# Patient Record
Sex: FEMALE | Race: WHITE | NOT HISPANIC OR LATINO
[De-identification: names, ages, dates, MRNs, and addresses within clinical notes are randomized per-mention and may not be internally consistent; named-entity substitution may affect disease eponyms.]

---

## 2017-07-24 PROBLEM — Z00.00 ENCOUNTER FOR PREVENTIVE HEALTH EXAMINATION: Status: ACTIVE | Noted: 2017-07-24

## 2017-08-08 ENCOUNTER — OTHER (OUTPATIENT)
Age: 22
End: 2017-08-08

## 2017-08-08 DIAGNOSIS — M25.561 PAIN IN RIGHT KNEE: ICD-10-CM

## 2017-08-23 ENCOUNTER — APPOINTMENT (OUTPATIENT)
Dept: ORTHOPEDIC SURGERY | Facility: CLINIC | Age: 22
End: 2017-08-23
Payer: COMMERCIAL

## 2017-08-23 VITALS — HEIGHT: 65 IN | BODY MASS INDEX: 24.99 KG/M2 | WEIGHT: 150 LBS

## 2017-08-23 VITALS — WEIGHT: 150 LBS | HEIGHT: 65 IN | BODY MASS INDEX: 24.99 KG/M2

## 2017-08-23 DIAGNOSIS — M25.561 PAIN IN RIGHT KNEE: ICD-10-CM

## 2017-08-23 DIAGNOSIS — M25.562 PAIN IN RIGHT KNEE: ICD-10-CM

## 2017-08-23 DIAGNOSIS — M22.41 CHONDROMALACIA PATELLAE, RIGHT KNEE: ICD-10-CM

## 2017-08-23 DIAGNOSIS — Z78.9 OTHER SPECIFIED HEALTH STATUS: ICD-10-CM

## 2017-08-23 DIAGNOSIS — Z80.9 FAMILY HISTORY OF MALIGNANT NEOPLASM, UNSPECIFIED: ICD-10-CM

## 2017-08-23 DIAGNOSIS — M22.42 CHONDROMALACIA PATELLAE, LEFT KNEE: ICD-10-CM

## 2017-08-23 PROCEDURE — 73560 X-RAY EXAM OF KNEE 1 OR 2: CPT | Mod: LT

## 2017-08-23 PROCEDURE — 99203 OFFICE O/P NEW LOW 30 MIN: CPT

## 2017-08-23 PROCEDURE — 73564 X-RAY EXAM KNEE 4 OR MORE: CPT | Mod: RT

## 2017-08-23 RX ORDER — NORGESTIMATE AND ETHINYL ESTRADIOL 7DAYSX3 28
0.18/0.215/0.25 KIT ORAL
Refills: 0 | Status: ACTIVE | COMMUNITY

## 2020-12-22 ENCOUNTER — TRANSCRIPTION ENCOUNTER (OUTPATIENT)
Age: 25
End: 2020-12-22

## 2020-12-30 ENCOUNTER — TRANSCRIPTION ENCOUNTER (OUTPATIENT)
Age: 25
End: 2020-12-30

## 2021-01-24 ENCOUNTER — TRANSCRIPTION ENCOUNTER (OUTPATIENT)
Age: 26
End: 2021-01-24

## 2021-02-20 ENCOUNTER — TRANSCRIPTION ENCOUNTER (OUTPATIENT)
Age: 26
End: 2021-02-20

## 2021-08-17 ENCOUNTER — TRANSCRIPTION ENCOUNTER (OUTPATIENT)
Age: 26
End: 2021-08-17

## 2022-07-12 ENCOUNTER — RESULT REVIEW (OUTPATIENT)
Age: 27
End: 2022-07-12

## 2022-11-28 ENCOUNTER — NON-APPOINTMENT (OUTPATIENT)
Age: 27
End: 2022-11-28

## 2022-12-26 ENCOUNTER — NON-APPOINTMENT (OUTPATIENT)
Age: 27
End: 2022-12-26

## 2023-03-17 ENCOUNTER — NON-APPOINTMENT (OUTPATIENT)
Age: 28
End: 2023-03-17

## 2023-07-12 ENCOUNTER — NON-APPOINTMENT (OUTPATIENT)
Age: 28
End: 2023-07-12

## 2023-07-21 ENCOUNTER — NON-APPOINTMENT (OUTPATIENT)
Age: 28
End: 2023-07-21

## 2023-10-10 PROBLEM — Z00.00 ENCOUNTER FOR PREVENTIVE HEALTH EXAMINATION: Status: ACTIVE | Noted: 2023-10-10

## 2023-11-09 ENCOUNTER — ASOB RESULT (OUTPATIENT)
Age: 28
End: 2023-11-09

## 2023-11-09 ENCOUNTER — APPOINTMENT (OUTPATIENT)
Dept: ANTEPARTUM | Facility: CLINIC | Age: 28
End: 2023-11-09
Payer: COMMERCIAL

## 2023-11-09 PROCEDURE — 76811 OB US DETAILED SNGL FETUS: CPT

## 2023-11-28 ENCOUNTER — APPOINTMENT (OUTPATIENT)
Dept: ANTEPARTUM | Facility: CLINIC | Age: 28
End: 2023-11-28
Payer: COMMERCIAL

## 2023-11-28 ENCOUNTER — ASOB RESULT (OUTPATIENT)
Age: 28
End: 2023-11-28

## 2023-11-28 PROCEDURE — 76816 OB US FOLLOW-UP PER FETUS: CPT

## 2024-03-05 ENCOUNTER — APPOINTMENT (OUTPATIENT)
Dept: AFTER HOURS CARE | Facility: EMERGENCY ROOM | Age: 29
End: 2024-03-05
Payer: SELF-PAY

## 2024-03-06 ENCOUNTER — OUTPATIENT (OUTPATIENT)
Dept: OUTPATIENT SERVICES | Facility: HOSPITAL | Age: 29
LOS: 1 days | End: 2024-03-06
Payer: COMMERCIAL

## 2024-03-06 VITALS
TEMPERATURE: 99 F | OXYGEN SATURATION: 100 % | RESPIRATION RATE: 18 BRPM | HEART RATE: 123 BPM | DIASTOLIC BLOOD PRESSURE: 59 MMHG | SYSTOLIC BLOOD PRESSURE: 104 MMHG

## 2024-03-06 VITALS — HEART RATE: 95 BPM | TEMPERATURE: 98 F | DIASTOLIC BLOOD PRESSURE: 58 MMHG | SYSTOLIC BLOOD PRESSURE: 116 MMHG

## 2024-03-06 DIAGNOSIS — O26.899 OTHER SPECIFIED PREGNANCY RELATED CONDITIONS, UNSPECIFIED TRIMESTER: ICD-10-CM

## 2024-03-06 LAB
ALBUMIN SERPL ELPH-MCNC: 3.7 G/DL — SIGNIFICANT CHANGE UP (ref 3.3–5)
ALP SERPL-CCNC: 140 U/L — HIGH (ref 40–120)
ALT FLD-CCNC: 17 U/L — SIGNIFICANT CHANGE UP (ref 10–45)
AMYLASE P1 CFR SERPL: 92 U/L — SIGNIFICANT CHANGE UP (ref 25–125)
ANION GAP SERPL CALC-SCNC: 18 MMOL/L — HIGH (ref 5–17)
AST SERPL-CCNC: 19 U/L — SIGNIFICANT CHANGE UP (ref 10–40)
BILIRUB SERPL-MCNC: 0.8 MG/DL — SIGNIFICANT CHANGE UP (ref 0.2–1.2)
BUN SERPL-MCNC: 10 MG/DL — SIGNIFICANT CHANGE UP (ref 7–23)
CALCIUM SERPL-MCNC: 8.9 MG/DL — SIGNIFICANT CHANGE UP (ref 8.4–10.5)
CHLORIDE SERPL-SCNC: 103 MMOL/L — SIGNIFICANT CHANGE UP (ref 96–108)
CO2 SERPL-SCNC: 18 MMOL/L — LOW (ref 22–31)
CREAT SERPL-MCNC: 0.57 MG/DL — SIGNIFICANT CHANGE UP (ref 0.5–1.3)
EGFR: 125 ML/MIN/1.73M2 — SIGNIFICANT CHANGE UP
GLUCOSE SERPL-MCNC: 98 MG/DL — SIGNIFICANT CHANGE UP (ref 70–99)
HCT VFR BLD CALC: 30.1 % — LOW (ref 34.5–45)
HGB BLD-MCNC: 9.7 G/DL — LOW (ref 11.5–15.5)
LIDOCAIN IGE QN: 40 U/L — SIGNIFICANT CHANGE UP (ref 7–60)
MCHC RBC-ENTMCNC: 20 PG — LOW (ref 27–34)
MCHC RBC-ENTMCNC: 32.2 GM/DL — SIGNIFICANT CHANGE UP (ref 32–36)
MCV RBC AUTO: 61.9 FL — LOW (ref 80–100)
NRBC # BLD: 0 /100 WBCS — SIGNIFICANT CHANGE UP (ref 0–0)
PHOSPHATE SERPL-MCNC: 3.6 MG/DL — SIGNIFICANT CHANGE UP (ref 2.5–4.5)
PLATELET # BLD AUTO: 166 K/UL — SIGNIFICANT CHANGE UP (ref 150–400)
POTASSIUM SERPL-MCNC: 3.5 MMOL/L — SIGNIFICANT CHANGE UP (ref 3.5–5.3)
POTASSIUM SERPL-SCNC: 3.5 MMOL/L — SIGNIFICANT CHANGE UP (ref 3.5–5.3)
PROT SERPL-MCNC: 7.3 G/DL — SIGNIFICANT CHANGE UP (ref 6–8.3)
RBC # BLD: 4.86 M/UL — SIGNIFICANT CHANGE UP (ref 3.8–5.2)
RBC # FLD: 15.5 % — HIGH (ref 10.3–14.5)
SODIUM SERPL-SCNC: 139 MMOL/L — SIGNIFICANT CHANGE UP (ref 135–145)
WBC # BLD: 11.59 K/UL — HIGH (ref 3.8–10.5)
WBC # FLD AUTO: 11.59 K/UL — HIGH (ref 3.8–10.5)

## 2024-03-06 PROCEDURE — 82150 ASSAY OF AMYLASE: CPT

## 2024-03-06 PROCEDURE — 80053 COMPREHEN METABOLIC PANEL: CPT

## 2024-03-06 PROCEDURE — 59025 FETAL NON-STRESS TEST: CPT | Mod: 26

## 2024-03-06 PROCEDURE — 96375 TX/PRO/DX INJ NEW DRUG ADDON: CPT

## 2024-03-06 PROCEDURE — 83690 ASSAY OF LIPASE: CPT

## 2024-03-06 PROCEDURE — G0463: CPT

## 2024-03-06 PROCEDURE — 99204 OFFICE O/P NEW MOD 45 MIN: CPT

## 2024-03-06 PROCEDURE — 36415 COLL VENOUS BLD VENIPUNCTURE: CPT

## 2024-03-06 PROCEDURE — 85027 COMPLETE CBC AUTOMATED: CPT

## 2024-03-06 PROCEDURE — 99222 1ST HOSP IP/OBS MODERATE 55: CPT

## 2024-03-06 PROCEDURE — 96361 HYDRATE IV INFUSION ADD-ON: CPT

## 2024-03-06 PROCEDURE — 84100 ASSAY OF PHOSPHORUS: CPT

## 2024-03-06 PROCEDURE — 96374 THER/PROPH/DIAG INJ IV PUSH: CPT

## 2024-03-06 RX ORDER — SODIUM CHLORIDE 9 MG/ML
1000 INJECTION, SOLUTION INTRAVENOUS ONCE
Refills: 0 | Status: COMPLETED | OUTPATIENT
Start: 2024-03-06 | End: 2024-03-06

## 2024-03-06 RX ORDER — ONDANSETRON 8 MG/1
1 TABLET, FILM COATED ORAL
Qty: 20 | Refills: 0
Start: 2024-03-06

## 2024-03-06 RX ORDER — ONDANSETRON 8 MG/1
4 TABLET, FILM COATED ORAL ONCE
Refills: 0 | Status: COMPLETED | OUTPATIENT
Start: 2024-03-06 | End: 2024-03-06

## 2024-03-06 RX ORDER — ACETAMINOPHEN 500 MG
1000 TABLET ORAL ONCE
Refills: 0 | Status: DISCONTINUED | OUTPATIENT
Start: 2024-03-06 | End: 2024-03-20

## 2024-03-06 RX ADMIN — SODIUM CHLORIDE 1000 MILLILITER(S): 9 INJECTION, SOLUTION INTRAVENOUS at 04:47

## 2024-03-06 RX ADMIN — ONDANSETRON 4 MILLIGRAM(S): 8 TABLET, FILM COATED ORAL at 04:47

## 2024-03-06 NOTE — ASSESSMENT
[FreeTextEntry1] : 27 yo woman  about 36 weeks pregnant without complications with complete prenatal care at [facility] presents with sudden onset around 7 PM of vomiting associated with tightness to abdomen and back that lasts a few minutes.  No vaginal discharge.  No fever or chills.  Some loose stools but not watery diarrhea.  No urinary symptoms.  This is different that her prior [place] contractions.    Assessment:  Possible labor pains

## 2024-03-06 NOTE — OB PROVIDER TRIAGE NOTE - NSHPPHYSICALEXAM_GEN_ALL_CORE
Objective  – VS  T(C): 38.2 (03-06-24 @ 04:24)  HR: 94 (03-06-24 @ 05:25)  BP: 104/59 (03-06-24 @ 03:51)  RR: 18 (03-06-24 @ 03:51)  SpO2: 97% (03-06-24 @ 05:25)  – PE:   General: NAD  CV: RRR  Pulm: breathing comfortably on RA, clear to auscultation b/l  Abd: gravid, nontender to palpation in all quadrants, negative Schuler's sign  Extr: moving all extremities with ease, nonedematous, non-TTP  – VE: 0.5/50/-3 by Dr. Gordon, PGY-1  – FHT: 140/moderate variability/+accels/-decels  – [place]: q5min  – EFW: 3000g by sono in office yesterday. Objective  – VS  T(C): 38.2 (03-06-24 @ 04:24)  HR: 94 (03-06-24 @ 05:25)  BP: 104/59 (03-06-24 @ 03:51)  RR: 18 (03-06-24 @ 03:51)  SpO2: 97% (03-06-24 @ 05:25)  – PE:   General: NAD  CV: regular rhythm, tachycardic  Pulm: breathing comfortably on RA, clear to auscultation b/l  Abd: gravid, nontender to palpation in all quadrants, negative Schuler's sign  Extr: moving all extremities with ease, nonedematous, non-TTP  – VE: 0.5/50/-3 by Dr. Gordon, PGY-1  – FHT: 140/moderate variability/+accels/-decels  – [place]: q5min  – EFW: 3000g by sono in office yesterday.

## 2024-03-06 NOTE — PLAN
[FreeTextEntry1] : Plan:  Go directly to ED in [place] for evaluation and triage.   They will determine whether you should go to L&D or be evaluated in ED first.

## 2024-03-06 NOTE — OB PROVIDER TRIAGE NOTE - NSOBPROVIDERNOTE_OBGYN_ALL_OB_FT
Assessment  28F  36.3wks gestational age present with complaints of nausea, nausea, and diarrhea.     Plan  -Labs ordered: CBC, CMP, amylase, lipase, K+, phosphate, UA  -Febrile 38.2C/100.8F: IV Tylenol  -Nausea: Zofran  -IV fluids    Discussed with Dr. Alee Mix PA-C Assessment  28F  36.3wks gestational age present with complaints of nausea, nausea, and diarrhea.     Plan  -Labs ordered: CBC, CMP, amylase, lipase, K+, phosphate, UA  -Febrile 38.2C/100.8F: IV Tylenol  -Nausea: Zofran  -IV fluids    Discussed with Dr. Alee Mix, PAVincentC    R4 ADDENDUM  CBC, CMP, Amylase, and Lipase resulted and wnl. Patient passed PO challenge, and has defervesced since presentation to triage. NST from prior reactive and reassuring. Patient stable for discharge home. Continue supportive care at home. F/u with OB as scheduled for routine prenatal care. Emergency return precautions discussed.     d/w Dr. Tremaine Cosby PGY4

## 2024-03-06 NOTE — OB PROVIDER TRIAGE NOTE - NS_OBGYNHISTORY_OBGYN_ALL_OB_FT
– PNC: Anemia on PO Fe. GBS unknown (swab completed yesterday in office). EFW 3000g by sono in office yesterday.  – OBHx: Primigravida  – GynHx: Denies ovarian cysts, fibroids, abnormal pap smears, STIs.

## 2024-03-06 NOTE — PHYSICAL EXAM
[Well Nourished] : well nourished [Well Developed] : well developed [Well-Appearing] : well-appearing [Normal Sclera/Conjunctiva] : normal sclera/conjunctiva [EOMI] : extraocular movements intact [Normal Outer Ear/Nose] : the outer ears and nose were normal in appearance [No Accessory Muscle Use] : no accessory muscle use [No Respiratory Distress] : no respiratory distress  [No Rash] : no rash [No Joint Swelling] : no joint swelling [Speech Grossly Normal] : speech grossly normal [No Focal Deficits] : no focal deficits [Alert and Oriented x3] : oriented to person, place, and time [Normal Affect] : the affect was normal [de-identified] : Mild distress [Normal Insight/Judgement] : insight and judgment were intact [de-identified] : Tightness to abdomen and flanks

## 2024-03-06 NOTE — OB PROVIDER TRIAGE NOTE - HISTORY OF PRESENT ILLNESS
Subjective  HPI: . +FM. -LOF. -CTXs. -VB. Pt denies any other concerns.    – PNC: Denies prenatal issues. GBS _.  EFW _g by sono.  – OBHx:   – GynHx: denies  – PMH: denies  – PSH: denies  – Psych: denies   – Social: denies   – Meds: PNV   – Allergies: NKDA  – Will accept blood transfusions? Yes    Objective  – VS  T(C): 38.2 (03-06-24 @ 04:24)  HR: 94 (03-06-24 @ 05:25)  BP: 104/59 (03-06-24 @ 03:51)  RR: 18 (03-06-24 @ 03:51)  SpO2: 97% (03-06-24 @ 05:25)  – PE:   CV: RRR  Pulm: breathing comfortably on RA  Abd: gravid, nontender  Extr: moving all extremities with ease  – FS:   – Spec: pooling, nitrazine, ferning, bleeding,  (lesions if patient with HSV2 history)  – VE: //  – FHT: baseline 1, mod variability, +accels, -decels  – [place]: qmin  – EFW: _g by sono  – Sono: vertex    Assessment  yoF GP gestational age presents for _.     Plan  -    Discussed with Dr. Alee Mix PA-C Subjective  HPI: 28F  36.3wks gestational age present with complaints of nausea and 4-5 episodes of emesis since 7pm last night, beginning 30 minutes after her dinner (few bites of craven's pie). Pt states she was lethargic for majority of the day, chills occurring post emesis, experiencing episodes of diarrhea, and pt endorses lightheadedness. Denies fever, sick contacts, or recent travel. 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 +FM. -LOF. -VB. Pt denies any other concerns.    – PNC: Anemia on PO Fe. GBS unknown (swab completed yesterday in office). EFW 3000g by diana in office yesterday.  – OBHx: Primigravida  – GynHx: Denies ovarian cysts, fibroids, abnormal pap smears, STIs.  – PMH: Anemia on PO Fe.   – PSH: Denies.  – Psych: Denies anxiety, depression  – Social: Denies T/E/D  – Meds: PNV, Fe  – Allergies: NKDA  – Will accept blood transfusions? Yes Subjective  HPI: 28F  36.3wks gestational age present with complaints of nausea and 4-5 episodes of emesis since 7pm last night, beginning 30 minutes after her dinner (few bites of craven's pie). Pt states she was lethargic for majority of the day, chills occurring post emesis, experiencing episodes of diarrhea, and pt endorses lightheadedness. Denies fever, sick contacts, or recent travel. 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 +FM. -LOF. -VB. Pt denies dysuria, hematuria, blood in emesis, or any other concerns.    – PNC: Anemia on PO Fe. GBS unknown (swab completed yesterday in office). EFW 3000g by diana in office yesterday.  – OBHx: Primigravida  – GynHx: Denies ovarian cysts, fibroids, abnormal pap smears, STIs.  – PMH: Anemia on PO Fe.   – PSH: Denies.  – Psych: Denies anxiety, depression  – Social: Denies T/E/D  – Meds: PNV, Fe  – Allergies: NKDA  – Will accept blood transfusions? Yes Subjective  HPI: 28F  36.3wks gestational age present with complaints of nausea and 4-5 episodes of emesis since 7pm last night, beginning 30 minutes after her dinner (few bites of craven's pie). Pt states she was lethargic for majority of the day, chills occurring post emesis, experiencing episodes of diarrhea, and pt endorses lightheadedness. Denies fever, sick contacts, or recent travel. 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 +FM. -LOF. -VB. Pt denies chest pain, palpitations, SOB, cough, dysuria, hematuria, blood in emesis, or any other concerns.    – PNC: Anemia on PO Fe. GBS unknown (swab completed yesterday in office). EFW 3000g by diana in office yesterday.  – OBHx: Primigravida  – GynHx: Denies ovarian cysts, fibroids, abnormal pap smears, STIs.  – PMH: Anemia on PO Fe.   – PSH: Denies.  – Psych: Denies anxiety, depression  – Social: Denies T/E/D  – Meds: PNV, Fe  – Allergies: NKDA  – Will accept blood transfusions? Yes

## 2024-03-06 NOTE — HISTORY OF PRESENT ILLNESS
[Home] : at home, [unfilled] , at the time of the visit. [Other Location: e.g. Home (Enter Location, City,State)___] : at [unfilled] [Spouse] : spouse [Verbal consent obtained from patient] : the patient, [unfilled] [FreeTextEntry8] : 29 yo woman  about 36 weeks pregnant without complications with complete prenatal care at [facility] presents with sudden onset around 7 PM of vomiting associated with tightness to abdomen and back that lasts a few minutes.  No vaginal discharge.  No fever or chills.  Some loose stools but not watery diarrhea.  No urinary symptoms.  This is different that her prior [place] Mag[place] contractions.

## 2024-03-07 ENCOUNTER — OUTPATIENT (OUTPATIENT)
Dept: OUTPATIENT SERVICES | Facility: HOSPITAL | Age: 29
LOS: 1 days | End: 2024-03-07
Payer: COMMERCIAL

## 2024-03-07 VITALS — OXYGEN SATURATION: 100 % | HEART RATE: 77 BPM

## 2024-03-07 DIAGNOSIS — R11.0 NAUSEA: ICD-10-CM

## 2024-03-07 DIAGNOSIS — Z3A.38 38 WEEKS GESTATION OF PREGNANCY: ICD-10-CM

## 2024-03-07 DIAGNOSIS — D64.9 ANEMIA, UNSPECIFIED: ICD-10-CM

## 2024-03-07 DIAGNOSIS — O26.893 OTHER SPECIFIED PREGNANCY RELATED CONDITIONS, THIRD TRIMESTER: ICD-10-CM

## 2024-03-07 DIAGNOSIS — O99.013 ANEMIA COMPLICATING PREGNANCY, THIRD TRIMESTER: ICD-10-CM

## 2024-03-07 DIAGNOSIS — R19.7 DIARRHEA, UNSPECIFIED: ICD-10-CM

## 2024-03-07 DIAGNOSIS — R42 DIZZINESS AND GIDDINESS: ICD-10-CM

## 2024-03-07 DIAGNOSIS — O26.899 OTHER SPECIFIED PREGNANCY RELATED CONDITIONS, UNSPECIFIED TRIMESTER: ICD-10-CM

## 2024-03-07 PROCEDURE — 96374 THER/PROPH/DIAG INJ IV PUSH: CPT

## 2024-03-07 PROCEDURE — G0463: CPT

## 2024-03-07 PROCEDURE — 96361 HYDRATE IV INFUSION ADD-ON: CPT

## 2024-03-07 PROCEDURE — 93005 ELECTROCARDIOGRAM TRACING: CPT

## 2024-03-07 PROCEDURE — 93010 ELECTROCARDIOGRAM REPORT: CPT

## 2024-03-07 RX ORDER — SODIUM CHLORIDE 9 MG/ML
1000 INJECTION, SOLUTION INTRAVENOUS ONCE
Refills: 0 | Status: COMPLETED | OUTPATIENT
Start: 2024-03-07 | End: 2024-03-07

## 2024-03-07 RX ORDER — SODIUM CHLORIDE 9 MG/ML
500 INJECTION, SOLUTION INTRAVENOUS ONCE
Refills: 0 | Status: COMPLETED | OUTPATIENT
Start: 2024-03-07 | End: 2024-03-07

## 2024-03-07 RX ORDER — ONDANSETRON 8 MG/1
4 TABLET, FILM COATED ORAL ONCE
Refills: 0 | Status: COMPLETED | OUTPATIENT
Start: 2024-03-07 | End: 2024-03-07

## 2024-03-07 RX ORDER — SODIUM CHLORIDE 9 MG/ML
1000 INJECTION, SOLUTION INTRAVENOUS
Refills: 0 | Status: DISCONTINUED | OUTPATIENT
Start: 2024-03-07 | End: 2024-03-22

## 2024-03-07 RX ADMIN — ONDANSETRON 4 MILLIGRAM(S): 8 TABLET, FILM COATED ORAL at 23:45

## 2024-03-07 RX ADMIN — SODIUM CHLORIDE 1000 MILLILITER(S): 9 INJECTION, SOLUTION INTRAVENOUS at 22:09

## 2024-03-07 RX ADMIN — SODIUM CHLORIDE 125 MILLILITER(S): 9 INJECTION, SOLUTION INTRAVENOUS at 21:34

## 2024-03-07 RX ADMIN — SODIUM CHLORIDE 500 MILLILITER(S): 9 INJECTION, SOLUTION INTRAVENOUS at 23:48

## 2024-03-07 NOTE — OB PROVIDER TRIAGE NOTE - NSHPPHYSICALEXAM_GEN_ALL_CORE
Vital Signs Last 24 Hrs  T(C): 36.8 (07 Mar 2024 19:57), Max: 36.8 (07 Mar 2024 19:54)  T(F): 98.2 (07 Mar 2024 19:57), Max: 98.24 (07 Mar 2024 19:54)  HR: 80 (07 Mar 2024 22:36) (74 - 97)  BP: 110/52 (07 Mar 2024 19:57) (110/52 - 110/52)  BP(mean): --  RR: 18 (07 Mar 2024 19:57) (18 - 18)  SpO2: 100% (07 Mar 2024 22:36) (91% - 100%)    Parameters below as of 07 Mar 2024 19:57  Patient On (Oxygen Delivery Method): room air        FHT: Baseline: 130 , moderate variability, + accels, - decels  [place]: q no ctx  Sono: Vertex, BPP 8/8, ANN 20

## 2024-03-07 NOTE — OB PROVIDER TRIAGE NOTE - NSOBPROVIDERNOTE_OBGYN_ALL_OB_FT
A/P: Pt is a 28y  who presents with nausea and vomiting.    1. NST reactive  2. BPP   3. 1L LR bolus, crackers     Discussed with Dr. Yenny Caraballo MD, PGY-1  Obstetrics and Gynecology A/P: Pt is a 28y  who presents with nausea and vomiting.    1. NST reactive  2. BPP   3. 1L LR bolus, crackers     Discussed with Dr. Yenny Caraballo MD, PGY-1  Obstetrics and Gynecology    Addendum  Pt sp 1L bolus and tolerated crackers. Upon reevaluation, pt reports persistent dizziness that worsened with ambulating to bathroom. She also reports feeling nausea.  - 500cc bolus  - EKG  - Zofran    D/w Dr. Yenny Caraballo PGY1 A/P: Pt is a 28y  who presents with nausea and vomiting.    1. NST reactive  2. BPP   3. 1L LR bolus, crackers     Discussed with Dr. Yenny Caraballo MD, PGY-1  Obstetrics and Gynecology    Addendum  Pt sp 1L bolus and tolerated crackers. Upon reevaluation, pt reports persistent dizziness that worsened with ambulating to bathroom. She also reports feeling nausea.  - 500cc bolus  - EKG  - Zofran    D/w Dr. Yenny Caraballo PGY1    Addendum:  EKG showing normal sinus rhythm. Pt tolerating PO clears and crackers. Pt to be discharged home and instructed to call the office if pt feels dizzy and N/V again.    D/w Dr. Yenny Caraballo PGY1 A/P: Pt is a 28y  who presents with nausea and vomiting.    1. NST reactive  2. BPP   3. 1L LR bolus, crackers     Discussed with Dr. Yenny Caraballo MD, PGY-1  Obstetrics and Gynecology    Addendum  Pt sp 1L bolus and tolerated crackers. Upon reevaluation, pt reports persistent dizziness with ambulating to bathroom. She also reports feeling nausea.  - 500cc bolus  - EKG  - Zofran    D/w Dr. Yenny Caraballo PGY1    Addendum:  EKG showing normal sinus rhythm. Pt tolerating PO clears and crackers. Pt to be discharged home and instructed to call the office if pt feels dizzy and N/V again.    D/w Dr. Yenny Caraballo PGY1    Case reviewed with PGY1  Agree with above plan of care. Pt likely dizzy from dehydration given the amt of vomiting. Some improvement with hydration but not 100% resolved. Rec pt continue PO hydration at home. If doesn't completely resolve pt instructed to call office as would need further [place].  Sheila Ramon, DO

## 2024-03-07 NOTE — OB PROVIDER TRIAGE NOTE - HISTORY OF PRESENT ILLNESS
HPI: Pt is a 28y   @36w3d presenting for persistent nausea and vomiting. Pt previously seen yesterday in triage for nausea and vomiting with 4-5 episodes of emesis before previous presentation, chills, lethargy, and lightheadedness. Today pt reports, continued vomiting with 4 episodes of emesis. She reports that nausea has improved, but dizziness with sitting, standing, and ambulation has become more predominant. Pt has been able to tolerate PO, and she drank one container of pedialyte, water, and chicken broth today. She denies chest pain, shortness of breath, or changes in vision.  FM (+)  LOF (-)  CTX (-)  VB (-)    – PNC: Anemia on PO Fe. GBS unknown (swab completed yesterday in office). EFW 3000g by diana in office yesterday.  – OBHx: Primigravida  – GynHx: Denies ovarian cysts, fibroids, abnormal pap smears, STIs.  – PMH: Anemia on PO Fe.   – PSH: Denies.  – Psych: Denies anxiety, depression  – Social: Denies T/E/D  – Meds: PNV, Fe  – Allergies: NKDA  – Will accept blood transfusions? Yes     HPI: Pt is a 28y   @36w3d presenting for persistent nausea and vomiting. Pt previously seen yesterday in triage for nausea and vomiting with 4-5 episodes of emesis before previous presentation, chills, lethargy, and lightheadedness. Today pt reports, continued vomiting with 4 episodes of emesis. She reports that nausea has improved this evening, but dizziness with sitting, standing, and ambulation has become more predominant. Pt has been able to tolerate PO, and she drank one container of pedialyte, water, and chicken broth today. She denies chest pain, shortness of breath, or changes in vision.  FM (+)  LOF (-)  CTX (-)  VB (-)    – PNC: Anemia on PO Fe. GBS unknown (swab completed yesterday in office). EFW 3000g by diana in office yesterday.  – OBHx: Primigravida  – GynHx: Denies ovarian cysts, fibroids, abnormal pap smears, STIs.  – PMH: Anemia on PO Fe.   – PSH: Denies.  – Psych: Denies anxiety, depression  – Social: Denies T/E/D  – Meds: PNV, Fe  – Allergies: NKDA  – Will accept blood transfusions? Yes

## 2024-03-07 NOTE — OB RN TRIAGE NOTE - NSICDXPASTMEDICALHX_GEN_ALL_CORE_FT
PAST MEDICAL HISTORY:  Anemia     
Simple: Patient demonstrates quick and easy understanding/Verbalized Understanding

## 2024-03-08 VITALS
RESPIRATION RATE: 18 BRPM | TEMPERATURE: 98 F | SYSTOLIC BLOOD PRESSURE: 112 MMHG | HEART RATE: 78 BPM | DIASTOLIC BLOOD PRESSURE: 55 MMHG

## 2024-03-08 DIAGNOSIS — D64.9 ANEMIA, UNSPECIFIED: ICD-10-CM

## 2024-03-08 DIAGNOSIS — R42 DIZZINESS AND GIDDINESS: ICD-10-CM

## 2024-03-08 DIAGNOSIS — O99.013 ANEMIA COMPLICATING PREGNANCY, THIRD TRIMESTER: ICD-10-CM

## 2024-03-08 DIAGNOSIS — Z3A.36 36 WEEKS GESTATION OF PREGNANCY: ICD-10-CM

## 2024-03-08 DIAGNOSIS — O26.893 OTHER SPECIFIED PREGNANCY RELATED CONDITIONS, THIRD TRIMESTER: ICD-10-CM

## 2024-03-08 DIAGNOSIS — O21.2 LATE VOMITING OF PREGNANCY: ICD-10-CM

## 2024-04-02 NOTE — OB PROVIDER TRIAGE NOTE - NSPREVIOUSTERM_OBGYN_ALL_OB
Care Management Follow Up    Length of Stay (days): 15    Expected Discharge Date: 04/02/2024     Concerns to be Addressed: discharge planning     Patient plan of care discussed at interdisciplinary rounds: Yes    Anticipated Discharge Disposition: Home     Anticipated Discharge Services:  (Stretcher Transportation Services, home RN/HHA services)    Home care orders to be faxed at time of discharge:   Skilled nursing to be provided by:  [facility]   PH: [phone]   FAX: [phone].     Anticipated Discharge DME:  (Hospital bed)    [facility]  Phone: [phone]  Fax: [phone]  Waiting for insurance company to approve bed.     Patient/family educated on Medicare website which has current facility and service quality ratings: yes  Education Provided on the Discharge Plan: Yes  Patient/Family in Agreement with the Plan: yes    Referrals Placed by CM/SW: Post Acute Facilities (and HHC agencies)  Private pay costs discussed: transportation costs and insurance costs out of pocket expenses    Additional Information:  Contacted [facility] regarding hospital bed. Patient's insurance has not completed the prior authorization for patient's home hospital bed. CHW and RNCC checking in every few days with [facility].     Patient is medially ready to discharge to home once hospital bed has been delivered to patient's home. Patient is requesting an ARDS Prone Position System, . This is a set of wedges used to promote optimal respiratory functioning while in bed. These wedges are not covered by insurance. Patient would need to order this system and pay out of pocket for any type of wedges.     Transportation cost has been discussed with patient. Patient will need a stretcher ride set up at time of discharge. RNCC will continue to follow.    Merlyn García RN, BSN  Care Coordinator, [facility]  Phone [phone]  Pager [phone]         No

## 2024-04-07 ENCOUNTER — INPATIENT (INPATIENT)
Facility: HOSPITAL | Age: 29
LOS: 1 days | Discharge: HOME | End: 2024-04-09
Attending: OBSTETRICS & GYNECOLOGY | Admitting: OBSTETRICS & GYNECOLOGY
Payer: COMMERCIAL

## 2024-04-07 VITALS — OXYGEN SATURATION: 97 % | HEART RATE: 113 BPM

## 2024-04-07 DIAGNOSIS — Z34.80 ENCOUNTER FOR SUPERVISION OF OTHER NORMAL PREGNANCY, UNSPECIFIED TRIMESTER: ICD-10-CM

## 2024-04-07 LAB
BASOPHILS # BLD AUTO: 0 K/UL — SIGNIFICANT CHANGE UP (ref 0–0.2)
BASOPHILS NFR BLD AUTO: 0 % — SIGNIFICANT CHANGE UP (ref 0–2)
BLD GP AB SCN SERPL QL: POSITIVE — SIGNIFICANT CHANGE UP
EOSINOPHIL # BLD AUTO: 0 K/UL — SIGNIFICANT CHANGE UP (ref 0–0.5)
EOSINOPHIL NFR BLD AUTO: 0 % — SIGNIFICANT CHANGE UP (ref 0–6)
HCT VFR BLD CALC: 29.4 % — LOW (ref 34.5–45)
HGB BLD-MCNC: 9.3 G/DL — LOW (ref 11.5–15.5)
LYMPHOCYTES # BLD AUTO: 1.26 K/UL — SIGNIFICANT CHANGE UP (ref 1–3.3)
LYMPHOCYTES # BLD AUTO: 17.8 % — SIGNIFICANT CHANGE UP (ref 13–44)
MCHC RBC-ENTMCNC: 19.6 PG — LOW (ref 27–34)
MCHC RBC-ENTMCNC: 31.6 GM/DL — LOW (ref 32–36)
MCV RBC AUTO: 62 FL — LOW (ref 80–100)
MONOCYTES # BLD AUTO: 0.19 K/UL — SIGNIFICANT CHANGE UP (ref 0–0.9)
MONOCYTES NFR BLD AUTO: 2.7 % — SIGNIFICANT CHANGE UP (ref 2–14)
NEUTROPHILS # BLD AUTO: 5.62 K/UL — SIGNIFICANT CHANGE UP (ref 1.8–7.4)
NEUTROPHILS NFR BLD AUTO: 79.5 % — HIGH (ref 43–77)
PLATELET # BLD AUTO: 142 K/UL — LOW (ref 150–400)
RBC # BLD: 4.74 M/UL — SIGNIFICANT CHANGE UP (ref 3.8–5.2)
RBC # FLD: 18.7 % — HIGH (ref 10.3–14.5)
RH IG SCN BLD-IMP: NEGATIVE — SIGNIFICANT CHANGE UP
WBC # BLD: 7.07 K/UL — SIGNIFICANT CHANGE UP (ref 3.8–10.5)
WBC # FLD AUTO: 7.07 K/UL — SIGNIFICANT CHANGE UP (ref 3.8–10.5)

## 2024-04-07 PROCEDURE — 86077 PHYS BLOOD BANK SERV XMATCH: CPT

## 2024-04-07 RX ORDER — SODIUM CHLORIDE 9 MG/ML
1000 INJECTION, SOLUTION INTRAVENOUS
Refills: 0 | Status: DISCONTINUED | OUTPATIENT
Start: 2024-04-07 | End: 2024-04-08

## 2024-04-07 RX ORDER — OXYTOCIN 10 UNIT/ML
333.33 VIAL (ML) INJECTION
Qty: 20 | Refills: 0 | Status: DISCONTINUED | OUTPATIENT
Start: 2024-04-07 | End: 2024-04-08

## 2024-04-07 RX ORDER — CHLORHEXIDINE GLUCONATE 213 G/1000ML
1 SOLUTION TOPICAL DAILY
Refills: 0 | Status: DISCONTINUED | OUTPATIENT
Start: 2024-04-07 | End: 2024-04-08

## 2024-04-07 RX ORDER — CITRIC ACID/SODIUM CITRATE 300-500 MG
15 SOLUTION, ORAL ORAL EVERY 6 HOURS
Refills: 0 | Status: DISCONTINUED | OUTPATIENT
Start: 2024-04-07 | End: 2024-04-08

## 2024-04-07 RX ADMIN — SODIUM CHLORIDE 125 MILLILITER(S): 9 INJECTION, SOLUTION INTRAVENOUS at 18:29

## 2024-04-07 NOTE — OB RN PATIENT PROFILE - FALL HARM RISK - UNIVERSAL INTERVENTIONS
Bed in lowest position, wheels locked, appropriate side rails in place/Call bell, personal items and telephone in reach/Instruct patient to call for assistance before getting out of bed or chair/Non-slip footwear when patient is out of bed/[place] to call system/Physically safe environment - no spills, clutter or unnecessary equipment/Purposeful Proactive Rounding/Room/bathroom lighting operational, light cord in reach

## 2024-04-07 NOTE — OB RN PATIENT PROFILE - FUNCTIONAL ASSESSMENT - BASIC MOBILITY 6.
4-calculated by average/Not able to assess (calculate score using [facility] averaging method)

## 2024-04-07 NOTE — OB PROVIDER H&P - HISTORY OF PRESENT ILLNESS
R1 Admission H&P    Subjective  HPI: 28y  @ 41w1 presents for IOL for late term.  Irregular contractions.   +FM. -LOF. -VB. Pt denies any other concerns.    – PNC: Anemia s/p 6-8 IV iron transfusions. GBS neg. EFW 3700g extrapolated from prior sono  – OBHx: G1  – GynHx: ovarian cyst resolved denies fibroids, endometriosis, abnormal pap smears, STIs  – PMH: beta thalassemia (carrier)   – PSH: denies  – Psych: denies   – Social: denies   – Meds: PNV   – Allergies: NKDA  – Will accept blood transfusions? Yes

## 2024-04-07 NOTE — OB PROVIDER H&P - ASSESSMENT
Assessment  28y   @ 41w1 presents for late term IOL.      Plan  1. Admit to L+D. Routine Labs. IVF.  2. IOL w/ buccal cytotec, cervical balloon.  3. Fetus: cat 1 tracing. VTX. EFW 3700g extrapolated from prior sono. Continuous EFM. Sono. No concerns.  4. Prenatal issues: anemia, s/p 6-8 IV iron  transfusions  5. GBS (-)  6. Pain: epidural PRN    Plan per attending physician, Dr. Yenny Quiroz, PGY-1

## 2024-04-07 NOTE — OB PROVIDER H&P - NSHPPHYSICALEXAM_GEN_ALL_CORE
Objective  – VS  T(C): 36.6 (04-07-24 @ 17:37)  HR: 96 (04-07-24 @ 18:22)  BP: 120/65 (04-07-24 @ 17:37)  RR: 16 (04-07-24 @ 17:37)  SpO2: 96% (04-07-24 @ 18:22)    Physical Exam  Pulm: breathing comfortably on RA  Abd: gravid, nontender  Extr: moving all extremities with ease  – VE: 1/0/03  – FHT: baseline 130, mod variability, +accels, -decels  – [place]: q5-8min  – EFW: 3700g extrapolated from prior sono   – Sono: vertex

## 2024-04-07 NOTE — OB RN PATIENT PROFILE - ABORTIONS, OB PROFILE
PATIENT INFORMATION    Anticipatory guidance discussed  Importance of regular dental care  Importance of regular exercise  Importance of varied diet    Follow-Up  - Return in 1 year for your yearly well visit.    13-17 years old Health and Safety Tips - The following hyperlinks are available to access via [employer]    Parent Education from [employer]    Educación para padres sobre niños sanos    Additional Educational Resources:  For additional resources regarding your symptoms, diagnosis, or further health information, please visit the Discover a Healthier You section on [email or web address].com/ or the Online Health Resources section in [employer].   0

## 2024-04-08 ENCOUNTER — TRANSCRIPTION ENCOUNTER (OUTPATIENT)
Age: 29
End: 2024-04-08

## 2024-04-08 LAB — T PALLIDUM AB TITR SER: NEGATIVE — SIGNIFICANT CHANGE UP

## 2024-04-08 RX ORDER — ACETAMINOPHEN 500 MG
975 TABLET ORAL
Refills: 0 | Status: DISCONTINUED | OUTPATIENT
Start: 2024-04-08 | End: 2024-04-09

## 2024-04-08 RX ORDER — OXYCODONE HYDROCHLORIDE 5 MG/1
5 TABLET ORAL
Refills: 0 | Status: DISCONTINUED | OUTPATIENT
Start: 2024-04-08 | End: 2024-04-09

## 2024-04-08 RX ORDER — SIMETHICONE 80 MG/1
80 TABLET, CHEWABLE ORAL EVERY 4 HOURS
Refills: 0 | Status: DISCONTINUED | OUTPATIENT
Start: 2024-04-08 | End: 2024-04-09

## 2024-04-08 RX ORDER — TETANUS TOXOID, REDUCED DIPHTHERIA TOXOID AND ACELLULAR PERTUSSIS VACCINE, ADSORBED 5; 2.5; 8; 8; 2.5 [IU]/.5ML; [IU]/.5ML; UG/.5ML; UG/.5ML; UG/.5ML
0.5 SUSPENSION INTRAMUSCULAR ONCE
Refills: 0 | Status: DISCONTINUED | OUTPATIENT
Start: 2024-04-08 | End: 2024-04-09

## 2024-04-08 RX ORDER — DIBUCAINE 1 %
1 OINTMENT (GRAM) RECTAL EVERY 6 HOURS
Refills: 0 | Status: DISCONTINUED | OUTPATIENT
Start: 2024-04-08 | End: 2024-04-09

## 2024-04-08 RX ORDER — MAGNESIUM HYDROXIDE 400 MG/1
30 TABLET, CHEWABLE ORAL
Refills: 0 | Status: DISCONTINUED | OUTPATIENT
Start: 2024-04-08 | End: 2024-04-09

## 2024-04-08 RX ORDER — OXYTOCIN 10 UNIT/ML
10 VIAL (ML) INJECTION ONCE
Refills: 0 | Status: COMPLETED | OUTPATIENT
Start: 2024-04-08 | End: 2024-04-08

## 2024-04-08 RX ORDER — DIPHENHYDRAMINE HCL 50 MG
25 CAPSULE ORAL EVERY 6 HOURS
Refills: 0 | Status: DISCONTINUED | OUTPATIENT
Start: 2024-04-08 | End: 2024-04-09

## 2024-04-08 RX ORDER — IBUPROFEN 200 MG
600 TABLET ORAL EVERY 6 HOURS
Refills: 0 | Status: COMPLETED | OUTPATIENT
Start: 2024-04-08 | End: 2025-03-07

## 2024-04-08 RX ORDER — SODIUM CHLORIDE 9 MG/ML
3 INJECTION INTRAMUSCULAR; INTRAVENOUS; SUBCUTANEOUS EVERY 8 HOURS
Refills: 0 | Status: DISCONTINUED | OUTPATIENT
Start: 2024-04-08 | End: 2024-04-09

## 2024-04-08 RX ORDER — OXYTOCIN 10 UNIT/ML
41.67 VIAL (ML) INJECTION
Qty: 20 | Refills: 0 | Status: DISCONTINUED | OUTPATIENT
Start: 2024-04-08 | End: 2024-04-09

## 2024-04-08 RX ORDER — OXYCODONE HYDROCHLORIDE 5 MG/1
5 TABLET ORAL ONCE
Refills: 0 | Status: DISCONTINUED | OUTPATIENT
Start: 2024-04-08 | End: 2024-04-09

## 2024-04-08 RX ORDER — LANOLIN
1 OINTMENT (GRAM) TOPICAL EVERY 6 HOURS
Refills: 0 | Status: DISCONTINUED | OUTPATIENT
Start: 2024-04-08 | End: 2024-04-09

## 2024-04-08 RX ORDER — AER TRAVELER 0.5 G/1
1 SOLUTION RECTAL; TOPICAL EVERY 4 HOURS
Refills: 0 | Status: DISCONTINUED | OUTPATIENT
Start: 2024-04-08 | End: 2024-04-09

## 2024-04-08 RX ORDER — HYDROCORTISONE 1 %
1 OINTMENT (GRAM) TOPICAL EVERY 6 HOURS
Refills: 0 | Status: DISCONTINUED | OUTPATIENT
Start: 2024-04-08 | End: 2024-04-09

## 2024-04-08 RX ORDER — BENZOCAINE 10 %
1 GEL (GRAM) MUCOUS MEMBRANE EVERY 6 HOURS
Refills: 0 | Status: DISCONTINUED | OUTPATIENT
Start: 2024-04-08 | End: 2024-04-09

## 2024-04-08 RX ORDER — PRAMOXINE HYDROCHLORIDE 150 MG/15G
1 AEROSOL, FOAM RECTAL EVERY 4 HOURS
Refills: 0 | Status: DISCONTINUED | OUTPATIENT
Start: 2024-04-08 | End: 2024-04-09

## 2024-04-08 RX ORDER — OXYTOCIN 10 UNIT/ML
4 VIAL (ML) INJECTION
Qty: 30 | Refills: 0 | Status: DISCONTINUED | OUTPATIENT
Start: 2024-04-08 | End: 2024-04-08

## 2024-04-08 RX ORDER — IBUPROFEN 200 MG
600 TABLET ORAL EVERY 6 HOURS
Refills: 0 | Status: DISCONTINUED | OUTPATIENT
Start: 2024-04-08 | End: 2024-04-09

## 2024-04-08 RX ORDER — KETOROLAC TROMETHAMINE 30 MG/ML
30 SYRINGE (ML) INJECTION ONCE
Refills: 0 | Status: DISCONTINUED | OUTPATIENT
Start: 2024-04-08 | End: 2024-04-08

## 2024-04-08 RX ADMIN — Medication 125 MILLIUNIT(S)/MIN: at 17:28

## 2024-04-08 RX ADMIN — Medication 600 MILLIGRAM(S): at 23:49

## 2024-04-08 RX ADMIN — Medication 975 MILLIGRAM(S): at 21:40

## 2024-04-08 RX ADMIN — Medication 975 MILLIGRAM(S): at 22:40

## 2024-04-08 RX ADMIN — Medication 10 UNIT(S): at 17:32

## 2024-04-08 RX ADMIN — Medication 30 MILLIGRAM(S): at 18:08

## 2024-04-08 RX ADMIN — Medication 4 MILLIUNIT(S)/MIN: at 05:56

## 2024-04-08 NOTE — OB PROVIDER DELIVERY SUMMARY - NSLOWPPHRISK_OBGYN_A_OB
No previous uterine incision/[place] Pregnancy/Less than or equal to 4 previous vaginal births/No known bleeding disorder/No history of postpartum hemorrhage

## 2024-04-08 NOTE — OB PROVIDER DELIVERY SUMMARY - NSPROVIDERDELIVERYNOTE_OBGYN_ALL_OB_FT
Patient began pushing at +1 station. Patient pushed and head delivered over an intact perineum followed by shoulders and body without complication. No nuchal cord noted. Terminal meconium present. Live, female infant was bulb suctioned at the perineum and then placed on mother's abdomen. Delayed cord clamping > 60 seconds. Cord was clamped and cut. Placenta delivered spontaneously with gentle traction. Bimanual exam performed with resolution of mild uterine atony. IM pitocin also given. Cervix, vagina, and perineum inspected and 2nd degree laceration noted and repaired with 2-0 vicryl. Hemostasis was appreciated. EBL 300cc. Counts correct x 2.

## 2024-04-08 NOTE — OB RN DELIVERY SUMMARY - NS_NUCHALCORD_OBGYN_ALL_OB
Discharge Instructions    Discharged to home by car with . Discharged via wheelchair, hospital escort: Yes.  Special equipment needed: Not Applicable    Be sure to schedule a follow-up appointment with your primary care doctor or any specialists as instructed.     Discharge Plan:   Diet Plan: Discussed  Activity Level: Discussed  Confirmed Follow up Appointment: Patient to Call and Schedule Appointment  Confirmed Symptoms Management: Discussed  Medication Reconciliation Updated: Yes  Pneumococcal Vaccine Administered/Refused: Not given - Patient refused pneumococcal vaccine  Influenza Vaccine Indication: Patient Refuses    I understand that a diet low in cholesterol, fat, and sodium is recommended for good health. Unless I have been given specific instructions below for another diet, I accept this instruction as my diet prescription.   Other diet: Diabetic, Cardiac    Special Instructions: None    · Is patient discharged on Warfarin / Coumadin?   No     Depression / Suicide Risk    As you are discharged from this [facility] facility, it is important to learn how to keep safe from harming yourself.    Recognize the warning signs:  · Abrupt changes in personality, positive or negative- including increase in energy   · Giving away possessions  · Change in eating patterns- significant weight changes-  positive or negative  · Change in sleeping patterns- unable to sleep or sleeping all the time   · Unwillingness or inability to communicate  · Depression  · Unusual sadness, discouragement and loneliness  · Talk of wanting to die  · Neglect of personal appearance   · Rebelliousness- reckless behavior  · Withdrawal from people/activities they love  · Confusion- inability to concentrate     If you or a loved one observes any of these behaviors or has concerns about self-harm, here's what you can do:  · Talk about it- your feelings and reasons for harming yourself  · Remove any means that you might use to hurt yourself  (examples: pills, rope, extension cords, firearm)  · Get professional help from the community (Mental Health, Substance Abuse, psychological counseling)  · Do not be alone:Call your Safe Contact- someone whom you trust who will be there for you.  · Call your local CRISIS HOTLINE 581-8123 or 026-458-5317  · Call your local Children's Mobile Crisis Response Team [place] (631) 679-6104 or www.[facility]  · Call the toll free National Suicide Prevention Hotlines   · National Suicide Prevention Lifeline 298-306-THOF (3740)  · [employer] Line Network 800-SUICIDE (755-6412)          Pielonefritis en los adultos  (Pyelonephritis, Adult)  La pielonefritis es daren infección del riñón. Los riñones son los órganos que filtran la trang y eliminan los residuos del torrente sanguíneo a través de la orina. La orina pasa desde los riñones, a través de los uréteres, hacia la vejiga. Hay dos tipos principales de pielonefritis:  · Infecciones que se inician rápidamente sin síntomas previos (pielonefritis aguda).  · Infecciones que persisten manny un período prolongado (pielonefritis crónica).  En la mayoría de los casos, la infección desaparece con el tratamiento y no causa otros problemas. Las infecciones más graves o crónicas a veces pueden propagarse al torrente sanguíneo u ocasionar otros problemas en los riñones.  CAUSAS  Por lo general, entre las causas de esta afección, se incluyen las siguientes:  · Bacterias que pasan desde la vejiga al riñón a través de la orina infectada. La orina de la vejiga puede infectarse por bacterias relacionadas con estas causas:  ¨ Infección en la vejiga (cistitis).  ¨ Inflamación de la próstata (prostatitis).  ¨ Relaciones sexuales en las mujeres.  · Bacterias que pasan del torrente sanguíneo al riñón.  FACTORES DE RIESGO  Es más probable que esta afección se manifieste en:  · Las embarazadas.  · Las personas de edad avanzada.  · Los diabéticos.  · Las personas que tienen cálculos  en los riñones o la vejiga.  · Las personas que tienen otras anomalías en el riñón o los uréteres.  · Las personas que tienen daren sonda vesical.  · Las personas con cáncer.  · Las personas que son sexualmente activas.  · Las mujeres que usan espermicidas.  · Las personas que bolanos tenido daren infección previa en las vías urinarias.  SÍNTOMAS  Los síntomas de esta afección incluyen lo siguiente:  · Ganas frecuentes de orinar.  · Necesidad intensa o persistente de orinar.  · Sensación de ardor o escozor al orinar.  · Dolor abdominal.  · Dolor de espalda.  · Dolor al costado del cuerpo o en la fosa lumbar.  · Fiebre.  · Escalofríos.  · Trang en la orina u orina oscura.  · Náuseas.  · Vómitos.  DIAGNÓSTICO  Esta afección se puede diagnosticar en función de lo siguiente:  · Examen físico e historia clínica.  · Análisis de orina.  · Análisis de trang.  También pueden hacerle estudios de diagnóstico por imágenes de los riñones, por ejemplo, daren ecografía o daren tomografía computarizada.  TRATAMIENTO  El tratamiento de esta afección puede depender de la gravedad de la infección.  · Si la infección es leve y se detecta rápidamente, pueden administrarle antibióticos por vía oral. Deberá shanique líquido para permanecer hidratado.  · Si la infección es más grave, es posible que deban hospitalizarlo para administrarle antibióticos directamente en daren vena a través de daren vía intravenosa (IV). Quizás también deban administrarle líquidos a través de daren vía intravenosa si no se encuentra humera hidratado. Después de la hospitalización, es posible que deba shanique antibióticos manny un tiempo.  Podrán prescribirle otros tratamientos según la causa de la infección.  INSTRUCCIONES PARA EL CUIDADO EN EL HOGAR  Medicamentos   · [place] los medicamentos de venta leo y los recetados solamente lenin se lo haya indicado el médico.  · Si le recetaron un antibiótico, tómelo lenin se lo haya indicado el médico. No deje de shanique los antibióticos aunque  comience a sentirse mejor.  Instrucciones generales   · Jenn suficiente líquido para mantener la orina jones o de color amarillo pálido.  · Evite la cafeína, el té y las bebidas gaseosas. Estas sustancias irritan la vejiga.  · Orine con frecuencia. Evite retener la orina manny largos períodos.  · Orine antes y después de las relaciones sexuales.  · Después de defecar, las mujeres deben higienizarse la región perineal desde adelante hacia atrás. Use cada trozo de papel higiénico solo daren vez.  · Concurra a todas las visitas de control lenin se lo haya indicado el médico. [place] es importante.  SOLICITE ATENCIÓN MÉDICA SI:  · Los síntomas no mejoran después de 2 días de tratamiento.  · Los síntomas empeoran.  · Tiene fiebre.  SOLICITE ATENCIÓN MÉDICA DE INMEDIATO SI:  · No puede shanique los antibióticos ni ingerir líquidos.  · Comienza a sentir escalofríos.  · Vomita.  · Siente un dolor intenso en la espalda o en la fosa lumbar.  · Se desmaya o siente daren debilidad extrema.       None

## 2024-04-08 NOTE — DISCHARGE NOTE OB - PLAN OF CARE
Nothing per vagina x 6 weeks - no intercourse, no tampons  Shower only - no baths/pools  Tylenol, motrin prn

## 2024-04-08 NOTE — DISCHARGE NOTE OB - HOSPITAL COURSE
29 yo  at 39 admitted for TIOL. Pt underwent uncomplicated  and had an uncomplicated postpartum course. Pt discharged home in stable condition on PPD

## 2024-04-08 NOTE — DISCHARGE NOTE OB - MEDICATION SUMMARY - MEDICATIONS TO STOP TAKING
I will STOP taking the medications listed below when I get home from the hospital:    ondansetron 4 mg oral tablet  -- 1 tab(s) by mouth every 8 hours as needed for  nausea

## 2024-04-08 NOTE — OB PROVIDER LABOR PROGRESS NOTE - ASSESSMENT
A/P: P0 at 41w1d LT IOL to start pitocin.   - Pitocin 4x4  - s/p cytotec  - FHR category 1     D/w Dr. Yenny Rabago, PGY-1 
A/P: P0 at 41w1d LT IOL with cervical change.   - Continue cytotec  - Category 1 FHR  - Continue to monitor    D/w Dr. Yenny Rabago, PGY-1 
- Patient AROM clear  - Fetal status reassuring  - Exam unchanged from prior  - Head well engaged following AROM circumferentially on cervix    d/w Dr. Shanti Mayes, PGY-1
Pt. to receive top off of epidural.  Cont. present mgmt.  Anticipate vaginal delivery.    LEONOR Owens Dr. & is en-route

## 2024-04-08 NOTE — OB RN DELIVERY SUMMARY - NS_BIRTHTRAUMAA_OBGYN_ALL_OB
Alexandria Gorman Patient Age: 89 year old  MESSAGE:   Patient was at at the ER in [facility] yesterday for diarrhea and constipation and was referred by EAG to make an appointment with AB this week.  There were no open appointments with AB at this time. Please advise on work-in.       Next and Last Visit with Provider and Department  Last visit with this provider: Visit date not found  Last visit with this department: 2/27/2019    Next visit with this provider: Visit date not found  Next visit with this department: Visit date not found    WEIGHT AND HEIGHT:   Wt Readings from Last 1 Encounters:   02/26/19 69.9 kg (154 lb)     Ht Readings from Last 1 Encounters:   02/12/19 5' 3\" (1.6 m)     BMI Readings from Last 1 Encounters:   02/26/19 27.28 kg/m²       ALLERGIES:  Azithromycin; Diclofenac sodium; Gabapentin; Methadone hcl; Pantoprazole; Pregabalin; Simvastatin; Amoxicillin-pot clavulanate; Atenolol; Fentanyl tdsy; Goodys extra strength; Omeprazole; Propoxyphene; and Sulfamethoxazole-trimethoprim  Current Outpatient Medications   Medication   • famotidine (PEPCID) 20 MG tablet   • furosemide (LASIX) 40 MG tablet   • Lactulose 20 GM/30ML Solution   • Insulin Pen Needle (BD PEN NEEDLE ISAURA U/F) 32G X 4 MM Misc   • warfarin (COUMADIN) 2.5 MG tablet   • [START ON 3/23/2019] oxyCODONE, IMM REL, (ROXICODONE) 5 MG immediate release tablet   • oxyCODONE, IMM REL, (ROXICODONE) 5 MG immediate release tablet   • oxyCODONE, IMM REL, (ROXICODONE) 5 MG immediate release tablet   • insulin glargine (LANTUS SOLOSTAR) 100 UNIT/ML pen-injector   • acetaminophen (TYLENOL) 650 MG CR tablet   • ALPRAZolam (XANAX) 0.25 MG tablet   • calcitRIOL (ROCALTROL) 0.25 MCG capsule   • cilostazol (PLETAL) 100 MG tablet   • Glucose Blood (BLOOD GLUCOSE TEST STRIPS) Strip   • Insulin Syringe 30G X 5/16\" 0.3 ML Misc   • metoPROLOL tartrate (LOPRESSOR) 50 MG tablet   • SOFTCLIX LANCETS Misc   • Multiple Vitamin tablet   • polyethylene glycol (MIRALAX)  powder   • pravastatin (PRAVACHOL) 80 MG tablet   • sitaGLIPtin (JANUVIA) 100 MG tablet   • nortriptyline (PAMELOR) 10 MG capsule     No current facility-administered medications for this visit.      PHARMACY to use: [facility]          Pharmacy preference(s) on file:   [id number] - [place], [place] - [address]  [address] [address]  Phone: [phone] Fax: [phone]      CALL BACK INFO: DO NOT LEAVE A MESSAGE - contact patient directly  ROUTING: Patient's physician/staff        PCP: Ulises Ralph MD         INS: Payor: MEDICARE / Plan: PARTA AND B / Product Type: MEDICARE   PATIENT ADDRESS:  [address] [address]     None

## 2024-04-08 NOTE — DISCHARGE NOTE OB - PATIENT PORTAL LINK FT
You can access the FollowMyHealth Patient Portal offered by [facility] by registering at the following website: [email or web address]. By joining [employer]’s FollowMyHealth portal, you will also be able to view your health information using other applications (apps) compatible with our system.

## 2024-04-08 NOTE — OB RN DELIVERY SUMMARY - NSSELHIDDEN_OBGYN_ALL_OB_FT
[NS_DeliveryAttending1_OBGYN_ALL_OB_FT:[id number]],[NS_DeliveryRN_OBGYN_ALL_OB_FT:[id number]]

## 2024-04-08 NOTE — DISCHARGE NOTE OB - CARE PLAN
Principal Discharge DX:	Vaginal delivery  Assessment and plan of treatment:	Nothing per vagina x 6 weeks - no intercourse, no tampons  Shower only - no baths/pools  Tylenol, motrin prn   1

## 2024-04-08 NOTE — OB PROVIDER LABOR PROGRESS NOTE - NS_OBIHIFHRDETAILS_OBGYN_ALL_OB_FT
CAT 1
120/mod/+acels/-decels
130/mod/+acels/-decels
category 1, baseline 120, mod variability, + accels, - decels

## 2024-04-08 NOTE — OB PROVIDER LABOR PROGRESS NOTE - NS_SUBJECTIVE/OBJECTIVE_OBGYN_ALL_OB_FT
Patient examined at bedside.
Patient examined at bedside.
Pt. with epidural admits to pressure, on Pitocin @ 8 mu/min.
Patient seen and examined for AROM.

## 2024-04-08 NOTE — DISCHARGE NOTE OB - CARE PROVIDER_API CALL
Yani Moser  Obstetrics and Gynecology  [address], [address]  [place] [address]  Phone: [phone]  Fax: [phone]  Follow Up Time:

## 2024-04-08 NOTE — OB RN DELIVERY SUMMARY - NS_SEPSISRSKCALC_OBGYN_ALL_OB_FT
EOS calculated successfully. EOS Risk Factor: 0.5/1000 live births ([facility] national incidence); GA=41w1d; Temp=98.24; ROM=8.9; GBS='Negative'; Antibiotics='No antibiotics or any antibiotics < 2 hrs prior to birth'

## 2024-04-09 VITALS
SYSTOLIC BLOOD PRESSURE: 127 MMHG | OXYGEN SATURATION: 96 % | HEART RATE: 78 BPM | RESPIRATION RATE: 18 BRPM | TEMPERATURE: 97 F | DIASTOLIC BLOOD PRESSURE: 83 MMHG

## 2024-04-09 LAB — KLEIHAUER-BETKE CALCULATION: 0.3 % — CRITICAL HIGH (ref 0–0.2)

## 2024-04-09 PROCEDURE — 86870 RBC ANTIBODY IDENTIFICATION: CPT

## 2024-04-09 PROCEDURE — 86780 TREPONEMA PALLIDUM: CPT

## 2024-04-09 PROCEDURE — 86850 RBC ANTIBODY SCREEN: CPT

## 2024-04-09 PROCEDURE — 86900 BLOOD TYPING SEROLOGIC ABO: CPT

## 2024-04-09 PROCEDURE — 86901 BLOOD TYPING SEROLOGIC RH(D): CPT

## 2024-04-09 PROCEDURE — 59050 FETAL MONITOR W/REPORT: CPT

## 2024-04-09 PROCEDURE — 85460 HEMOGLOBIN FETAL: CPT

## 2024-04-09 PROCEDURE — 85025 COMPLETE CBC W/AUTO DIFF WBC: CPT

## 2024-04-09 RX ORDER — DIBUCAINE 1 %
1 OINTMENT (GRAM) RECTAL
Qty: 0 | Refills: 0 | DISCHARGE
Start: 2024-04-09

## 2024-04-09 RX ORDER — ACETAMINOPHEN 500 MG
3 TABLET ORAL
Qty: 0 | Refills: 0 | DISCHARGE
Start: 2024-04-09

## 2024-04-09 RX ORDER — IBUPROFEN 200 MG
1 TABLET ORAL
Qty: 0 | Refills: 0 | DISCHARGE
Start: 2024-04-09

## 2024-04-09 RX ADMIN — Medication 975 MILLIGRAM(S): at 14:49

## 2024-04-09 RX ADMIN — Medication 975 MILLIGRAM(S): at 22:43

## 2024-04-09 RX ADMIN — Medication 975 MILLIGRAM(S): at 03:45

## 2024-04-09 RX ADMIN — Medication 600 MILLIGRAM(S): at 19:00

## 2024-04-09 RX ADMIN — Medication 975 MILLIGRAM(S): at 15:30

## 2024-04-09 RX ADMIN — Medication 600 MILLIGRAM(S): at 05:41

## 2024-04-09 RX ADMIN — Medication 975 MILLIGRAM(S): at 09:50

## 2024-04-09 RX ADMIN — Medication 975 MILLIGRAM(S): at 09:03

## 2024-04-09 RX ADMIN — Medication 600 MILLIGRAM(S): at 00:50

## 2024-04-09 RX ADMIN — Medication 975 MILLIGRAM(S): at 02:25

## 2024-04-09 RX ADMIN — Medication 600 MILLIGRAM(S): at 11:30

## 2024-04-09 RX ADMIN — SODIUM CHLORIDE 3 MILLILITER(S): 9 INJECTION INTRAMUSCULAR; INTRAVENOUS; SUBCUTANEOUS at 00:04

## 2024-04-09 RX ADMIN — Medication 600 MILLIGRAM(S): at 12:45

## 2024-04-09 RX ADMIN — Medication 975 MILLIGRAM(S): at 23:15

## 2024-04-09 RX ADMIN — Medication 600 MILLIGRAM(S): at 18:19

## 2024-04-09 RX ADMIN — Medication 600 MILLIGRAM(S): at 06:40

## 2024-04-09 RX ADMIN — Medication 1 TABLET(S): at 11:30

## 2024-04-09 NOTE — PROGRESS NOTE ADULT - SUBJECTIVE AND OBJECTIVE BOX
PPD#1- ATTENDING NOTE    S: Patient doing well. Minimal lochia. Pain controlled.    O: Vital Signs Last 24 Hrs  T(C): 36.7 (2024 06:11), Max: 37 (2024 19:00)  T(F): 98 (2024 06:11), Max: 98.6 (2024 19:00)  HR: 69 (2024 06:11) (59 - 117)  BP: 128/78 (2024 06:11) (95/52 - 163/62)  BP(mean): --  RR: 18 (2024 06:11) (18 - 19)  SpO2: 97% (:11) (54% - 100%)    Parameters below as of 2024 06:11  Patient On (Oxygen Delivery Method): room air        Gen: NAD  Abd: soft, NT, ND, fundus firm below umbilicus  Lochia: moderate  Ext: no tenderness, no hyper reflexia  Voiding well    Labs:                        9.3    7.07  )-----------( 142      ( 2024 18:22 )             29.4     ABO Interpretation: O ( @ 18:22)  Rh Interpretation: Negative ( @ 18:22)  ABO Interpretation: O (- @ 18:22)  Rh Interpretation: Negative ( @ 18:22)    Antibody Screen Positive    A: 28y PPD# s/p  doing well.    Plan:  Analgesia prn  Regular diet  patient would like to go home later today.  needs rhogam prior to discharge        Office [phone]  Dr. Penaloza

## 2024-04-09 NOTE — PROGRESS NOTE ADULT - SUBJECTIVE AND OBJECTIVE BOX
Postpartum Note- PPD#1    Allergies  No Known Allergies    Rh neg ( Rh pos) - Rhogam ordered  Rubella immune  RPR neg        Patient w/o complaints, pain is controlled.    Pt is OOB, tolerating PO, passing flatus. Lochia WNL.     O:  Vital Signs Last 24 Hrs  T(C): 36.7 (2024 06:11), Max: 37 (2024 19:00)  T(F): 98 (2024 06:11), Max: 98.6 (2024 19:00)  HR: 69 (2024 06:11) (59 - 117)  BP: 128/78 (2024 06:11) (95/52 - 163/62)  BP(mean): --  RR: 18 (2024 06:11) (16 - 19)  SpO2: 97% (2024 06:11) (54% - 100%)    Parameters below as of 2024 06:11  Patient On (Oxygen Delivery Method): room air         Gen: NAD  Heart: S1S2 RRR  Lungs: CTA b/l  Abdomen: Soft, nontender, non-distended, fundus firm.  Lochia WNL  Ext: Neg edema, Neg calf tenderness    LABS:               9.3    7.07  )-----------( 142      ( 04-07 @ 18:22 )             29.4       PAST MEDICAL & SURGICAL HISTORY:  Anemia      Carrier of beta thalassemia        Current Issues: none

## 2024-04-11 ENCOUNTER — EMERGENCY (EMERGENCY)
Facility: HOSPITAL | Age: 29
LOS: 1 days | Discharge: HOME | End: 2024-04-11
Attending: EMERGENCY MEDICINE
Payer: COMMERCIAL

## 2024-04-11 VITALS
OXYGEN SATURATION: 100 % | HEIGHT: 65 IN | HEART RATE: 72 BPM | DIASTOLIC BLOOD PRESSURE: 70 MMHG | RESPIRATION RATE: 18 BRPM | TEMPERATURE: 98 F | SYSTOLIC BLOOD PRESSURE: 102 MMHG

## 2024-04-11 LAB
ALBUMIN SERPL ELPH-MCNC: 3.3 G/DL — SIGNIFICANT CHANGE UP (ref 3.3–5)
ALP SERPL-CCNC: 143 U/L — HIGH (ref 40–120)
ALT FLD-CCNC: 28 U/L — SIGNIFICANT CHANGE UP (ref 10–45)
ANION GAP SERPL CALC-SCNC: 15 MMOL/L — SIGNIFICANT CHANGE UP (ref 5–17)
ANISOCYTOSIS BLD QL: SLIGHT — SIGNIFICANT CHANGE UP
APTT BLD: 27.6 SEC — SIGNIFICANT CHANGE UP (ref 24.5–35.6)
AST SERPL-CCNC: 36 U/L — SIGNIFICANT CHANGE UP (ref 10–40)
BASOPHILS # BLD AUTO: 0.12 K/UL — SIGNIFICANT CHANGE UP (ref 0–0.2)
BASOPHILS NFR BLD AUTO: 0.9 % — SIGNIFICANT CHANGE UP (ref 0–2)
BILIRUB SERPL-MCNC: 0.4 MG/DL — SIGNIFICANT CHANGE UP (ref 0.2–1.2)
BUN SERPL-MCNC: 14 MG/DL — SIGNIFICANT CHANGE UP (ref 7–23)
CALCIUM SERPL-MCNC: 8.4 MG/DL — SIGNIFICANT CHANGE UP (ref 8.4–10.5)
CHLORIDE SERPL-SCNC: 107 MMOL/L — SIGNIFICANT CHANGE UP (ref 96–108)
CO2 SERPL-SCNC: 21 MMOL/L — LOW (ref 22–31)
CREAT SERPL-MCNC: 0.68 MG/DL — SIGNIFICANT CHANGE UP (ref 0.5–1.3)
DACRYOCYTES BLD QL SMEAR: SLIGHT — SIGNIFICANT CHANGE UP
EGFR: 120 ML/MIN/1.73M2 — SIGNIFICANT CHANGE UP
ELLIPTOCYTES BLD QL SMEAR: SLIGHT — SIGNIFICANT CHANGE UP
EOSINOPHIL # BLD AUTO: 0.23 K/UL — SIGNIFICANT CHANGE UP (ref 0–0.5)
EOSINOPHIL NFR BLD AUTO: 1.7 % — SIGNIFICANT CHANGE UP (ref 0–6)
GLUCOSE SERPL-MCNC: 130 MG/DL — HIGH (ref 70–99)
HCT VFR BLD CALC: 29.3 % — LOW (ref 34.5–45)
HGB BLD-MCNC: 9.2 G/DL — LOW (ref 11.5–15.5)
HYPOCHROMIA BLD QL: SLIGHT — SIGNIFICANT CHANGE UP
INR BLD: 0.91 RATIO — SIGNIFICANT CHANGE UP (ref 0.85–1.18)
LYMPHOCYTES # BLD AUTO: 1.28 K/UL — SIGNIFICANT CHANGE UP (ref 1–3.3)
LYMPHOCYTES # BLD AUTO: 9.6 % — LOW (ref 13–44)
MAGNESIUM SERPL-MCNC: 1.8 MG/DL — SIGNIFICANT CHANGE UP (ref 1.6–2.6)
MANUAL SMEAR VERIFICATION: SIGNIFICANT CHANGE UP
MCHC RBC-ENTMCNC: 19.8 PG — LOW (ref 27–34)
MCHC RBC-ENTMCNC: 31.4 GM/DL — LOW (ref 32–36)
MCV RBC AUTO: 63.1 FL — LOW (ref 80–100)
MICROCYTES BLD QL: SIGNIFICANT CHANGE UP
MONOCYTES # BLD AUTO: 0.47 K/UL — SIGNIFICANT CHANGE UP (ref 0–0.9)
MONOCYTES NFR BLD AUTO: 3.5 % — SIGNIFICANT CHANGE UP (ref 2–14)
NEUTROPHILS # BLD AUTO: 11.25 K/UL — HIGH (ref 1.8–7.4)
NEUTROPHILS NFR BLD AUTO: 82.5 % — HIGH (ref 43–77)
NEUTS BAND # BLD: 1.8 % — SIGNIFICANT CHANGE UP (ref 0–8)
OVALOCYTES BLD QL SMEAR: SIGNIFICANT CHANGE UP
PLAT MORPH BLD: ABNORMAL
PLATELET # BLD AUTO: 142 K/UL — LOW (ref 150–400)
POIKILOCYTOSIS BLD QL AUTO: SIGNIFICANT CHANGE UP
POLYCHROMASIA BLD QL SMEAR: SLIGHT — SIGNIFICANT CHANGE UP
POTASSIUM SERPL-MCNC: 3.5 MMOL/L — SIGNIFICANT CHANGE UP (ref 3.5–5.3)
POTASSIUM SERPL-SCNC: 3.5 MMOL/L — SIGNIFICANT CHANGE UP (ref 3.5–5.3)
PROT SERPL-MCNC: 6.7 G/DL — SIGNIFICANT CHANGE UP (ref 6–8.3)
PROTHROM AB SERPL-ACNC: 10 SEC — SIGNIFICANT CHANGE UP (ref 9.5–13)
RBC # BLD: 4.64 M/UL — SIGNIFICANT CHANGE UP (ref 3.8–5.2)
RBC # FLD: 18.6 % — HIGH (ref 10.3–14.5)
RBC BLD AUTO: ABNORMAL
SCHISTOCYTES BLD QL AUTO: SLIGHT — SIGNIFICANT CHANGE UP
SODIUM SERPL-SCNC: 143 MMOL/L — SIGNIFICANT CHANGE UP (ref 135–145)
TROPONIN T, HIGH SENSITIVITY RESULT: <6 NG/L — SIGNIFICANT CHANGE UP (ref 0–51)
WBC # BLD: 13.35 K/UL — HIGH (ref 3.8–10.5)
WBC # FLD AUTO: 13.35 K/UL — HIGH (ref 3.8–10.5)

## 2024-04-11 PROCEDURE — 84484 ASSAY OF TROPONIN QUANT: CPT

## 2024-04-11 PROCEDURE — 71046 X-RAY EXAM CHEST 2 VIEWS: CPT

## 2024-04-11 PROCEDURE — 80053 COMPREHEN METABOLIC PANEL: CPT

## 2024-04-11 PROCEDURE — 85610 PROTHROMBIN TIME: CPT

## 2024-04-11 PROCEDURE — 83735 ASSAY OF MAGNESIUM: CPT

## 2024-04-11 PROCEDURE — 71275 CT ANGIOGRAPHY CHEST: CPT | Mod: MC

## 2024-04-11 PROCEDURE — 85025 COMPLETE CBC W/AUTO DIFF WBC: CPT

## 2024-04-11 PROCEDURE — 71046 X-RAY EXAM CHEST 2 VIEWS: CPT | Mod: 26

## 2024-04-11 PROCEDURE — 99285 EMERGENCY DEPT VISIT HI MDM: CPT | Mod: 25

## 2024-04-11 PROCEDURE — 93005 ELECTROCARDIOGRAM TRACING: CPT

## 2024-04-11 PROCEDURE — 99285 EMERGENCY DEPT VISIT HI MDM: CPT

## 2024-04-11 PROCEDURE — 85730 THROMBOPLASTIN TIME PARTIAL: CPT

## 2024-04-11 PROCEDURE — 71275 CT ANGIOGRAPHY CHEST: CPT | Mod: 26,MC

## 2024-04-11 RX ORDER — SODIUM CHLORIDE 9 MG/ML
500 INJECTION INTRAMUSCULAR; INTRAVENOUS; SUBCUTANEOUS ONCE
Refills: 0 | Status: COMPLETED | OUTPATIENT
Start: 2024-04-11 | End: 2024-04-11

## 2024-04-11 RX ORDER — ACETAMINOPHEN 500 MG
650 TABLET ORAL ONCE
Refills: 0 | Status: COMPLETED | OUTPATIENT
Start: 2024-04-11 | End: 2024-04-11

## 2024-04-11 RX ADMIN — Medication 650 MILLIGRAM(S): at 22:09

## 2024-04-11 RX ADMIN — SODIUM CHLORIDE 500 MILLILITER(S): 9 INJECTION INTRAMUSCULAR; INTRAVENOUS; SUBCUTANEOUS at 21:00

## 2024-04-11 NOTE — ED PROVIDER NOTE - PROGRESS NOTE DETAILS
Eyad PGY3  Patient signed out to me pending CTA chest result. In summary 28F with recent vaginal delivery presents with epigastric/chest pain. CTA chest did not show PE, but showed median arcuate ligament syndrome. Discussed findings with patient and  at bedside and patient reports feeling better. Reports that she has been having epigastric pain since pregnancy but it worsen in the last 2 days and thus presented here but has improved since being here. Abdomen exam is nontender and soft, low suspicion for ischemic bowel at this time. Patient and  comfortable with going home with rapid GI/surgery follow up. Return precautions reviewed.

## 2024-04-11 NOTE — ED ADULT NURSE NOTE - CHIEF COMPLAINT QUOTE
3 days postpartum, chest pain radiating to the mid back, worse on inspiration and on exertion x 2 days.

## 2024-04-11 NOTE — ED PROVIDER NOTE - NSFOLLOWUPINSTRUCTIONS_ED_ALL_ED_FT
You were seen in the emergency department for epigastric and chest pain.   Your workup in the emergency department includes bloodwork and CT scan of chest, found to have median arcuate ligament syndrome.    You can find the results of all the tests in this discharge packet.   Please follow up with your primary care doctor within 48 hours for continuation of care.   Please follow up with GI and General Surgery for further management of incidental finding of median arcuate ligament syndrome.     Return to the emergency department if you experience any new/concerning/worsening symptoms such as but not limited to: fever (>100.3F), intractable nausea, vomiting, chest pain, shortness of breath, abdominal pain.

## 2024-04-11 NOTE — ED PROVIDER NOTE - PATIENT PORTAL LINK FT
You can access the FollowMyHealth Patient Portal offered by [facility] by registering at the following website: [email or web address]. By joining [employer]’s FollowMyHealth portal, you will also be able to view your health information using other applications (apps) compatible with our system.

## 2024-04-11 NOTE — ED ADULT NURSE NOTE - NSFALLUNIVINTERV_ED_ALL_ED
Bed/Stretcher in lowest position, wheels locked, appropriate side rails in place/Call bell, personal items and telephone in reach/Instruct patient to call for assistance before getting out of bed/chair/stretcher/Non-slip footwear applied when patient is off stretcher/[place] to call system/Physically safe environment - no spills, clutter or unnecessary equipment/Purposeful proactive rounding/Room/bathroom lighting operational, light cord in reach

## 2024-04-11 NOTE — ED PROVIDER NOTE - OBJECTIVE STATEMENT
28-year-old female, past medical history of beta thalassemia, recent spontaneous vaginal delivery on 04/08, presenting with 2 days onset of midsternal chest pain.  Denies any specific trigger.  Reports symptoms exacerbated by taking deep breath, walking.  No prior history of coronary artery disease or blood clots.  Her father has coronary artery disease at the age of late 60s.  Denies fever, cough, recent illness.  Denies numbness or weakness of extremity.

## 2024-04-11 NOTE — ED PROVIDER NOTE - DIFFERENTIAL DIAGNOSIS
Differential Diagnosis Pneumonia, chest wall pain, back pain associated with labor  GERD epigastric pain pulmonary embolism CHF

## 2024-04-11 NOTE — ED PROVIDER NOTE - PHYSICAL EXAMINATION
Gen: Patient is well-appearing, NAD, AAOx3, able to ambulate without assistance  HEENT: NCAT, normal conjunctiva, tongue midline, oral mucosa moist  Lung: CTAB, no respiratory distress, no wheezes/rhonchi/rales B/L, speaking in full sentences  CV: RRR, no murmurs, rubs or gallops, distal pulses 2+ b/l  Abd: soft, NT, ND, no guarding, no rigidity, no rebound tenderness  MSK: no visible deformities, ROM normal in UE/LE  Neuro: No focal sensory or motor deficits  Skin: Warm, well perfused, no rash, mild b/l LE pitting edema   Psych: normal affect, calm

## 2024-04-11 NOTE — ED CLERICAL - NS ED CLERK NOTE PRE-ARRIVAL INFORMATION; ADDITIONAL PRE-ARRIVAL INFORMATION
[phone][facility] referring pt s/p vaginal delivery 4/8/24 with complaint of inspirational chest pain x yesterday, denying any associated shortness of breath, dizziness or heavy vaginal bleeding.  Hx beta thalassemia, anemia.  Requesting work up for PE, consult OB.

## 2024-04-11 NOTE — ED ADULT NURSE NOTE - OBJECTIVE STATEMENT
Pt 28 year old female, A/O x4. pt came in due to midsternal chest pain radiating to back. PMH- anemia. Pt states she gave birth on Monday- Vaginal birth to full term baby. Pt states to end of pregnancy, she needed to receive three iron transfusions. Yesterday, pt developed epigastric pain that became midsternal chest pain radiating to back. Upon assessment, pt well appearing speaking in full coherent sentences. Skin- warm, dr,y intact. Abd. soft, nontender, nondistended. NSR on cardiac monitor. Also complains of SOB and HART.

## 2024-04-12 VITALS
OXYGEN SATURATION: 100 % | TEMPERATURE: 98 F | HEART RATE: 75 BPM | SYSTOLIC BLOOD PRESSURE: 125 MMHG | RESPIRATION RATE: 18 BRPM | DIASTOLIC BLOOD PRESSURE: 69 MMHG

## 2024-04-12 PROBLEM — D56.3 THALASSEMIA MINOR: Chronic | Status: ACTIVE | Noted: 2024-04-07

## 2024-04-14 ENCOUNTER — TRANSCRIPTION ENCOUNTER (OUTPATIENT)
Age: 29
End: 2024-04-14

## 2024-04-22 ENCOUNTER — APPOINTMENT (OUTPATIENT)
Dept: CT IMAGING | Facility: CLINIC | Age: 29
End: 2024-04-22
Payer: COMMERCIAL

## 2024-04-22 PROCEDURE — 71250 CT THORAX DX C-: CPT | Mod: 59

## 2024-04-22 PROCEDURE — 75574 CT ANGIO HRT W/3D IMAGE: CPT

## 2024-10-08 NOTE — ED ADULT NURSE NOTE - NSFALLLASTSIX_ED_ALL_ED
[facility] encounter  HEMATOLOGY/ONCOLOGY OFFICE VISIT    CHIEF COMPLAINT:   Breast Cancer      ONCOLOGIC HISTORY:  Malignant neoplasm of left breast in female, estrogen receptor negative 8/30/2024.  T2 N0 M0  ER negative, ER positive in 5% cells, HER2 negative.  Essentially triple negative.  Left axillary SLNB Negative 9/17/2024.  Neoadjuvant Carboplatin + Taxol + Pembrolizumab beginning 9/24/2024.    HISTORY OF PRESENT ILLNESS:     The patient is a 61-year-old female who presents today for evaluation and treatment of breast cancer.    She reports experiencing shakiness and dizziness, which she attributes to the steroids prescribed for her treatment. She was advised by the Physician Assistant to take one dose in the morning and another in the afternoon, a regimen she plans to start next week.    She does not experience any nausea or vomiting but mentions mild diarrhea that was effectively managed with Imodium. She has not noticed any skin rash or increase in shortness of breath. She also does not have fevers or chills.    However, she reports pain at the site of her port, to the extent that wearing a shirt causes discomfort.    ALLERGIES  She is not allergic to any antibiotics.    RESULTS     Laboratory Studies  White blood cell count is 5.4. Hemoglobin is 11.5. Platelets are 167. Blood glucose is 114. Sodium, potassium, kidney, and liver function are normal.    ASSESS PLAN  ASSESSMENT: Malignant neoplasm of left breast in female, estrogen receptor negative, unspecified site of breast (CMD)  (primary encounter diagnosis)    Encounter for antineoplastic chemotherapy    Skin infection  Plan: amoxicillin-clavulanate (AUGMENTIN) 875-125 MG         per tablet        1. Breast Cancer.  Her lab results indicate a healthy white count at 5.4, satisfactory hemoglobin level at 11.5, and a normal platelet count of 167. Her blood glucose level is 114, which is within an acceptable range. Sodium, potassium, kidney, and  liver functions are all within normal limits. There is no evidence of major grade 3 or 4 toxicity. Given her stable counts, cycle 1 day 15 of her treatment will proceed. She is scheduled to receive paclitaxel 80 mg/m² today. To boost her white blood cells, G-CSF injections will be administered for 3 days. She is due to commence cycle 2 on 10/15/2024. A nurse practitioner or PA will conduct a follow-up in 1 and 2 weeks, and she will be seen in 3 weeks.    2. Port Infection.  The Mediport site is erythematous and tender, with no discharge or open areas noted. A 10-day course of Augmentin will be prescribed and sent to her [facility] pharmacy. IV antibiotics will be administered for 4 days while she is here to control the infection.    Follow-up  Return in 3 weeks for follow up.    Summary:  T2 N0 M0 triple negative.  Neoadjuvant pertuzumab plus Taxol plus carboplatin beginning 9/20/2024.  No grade 3/4 toxicity.  No evidence of thrombosis or hemorrhage.  Labs reviewed and adequate for treatment.  Proceed with cycle 1 day 15 paclitaxel.  Labs and APC visit in 1 and 2 weeks to continue chemotherapy.  Labs and MD visit in 3 weeks.  Cubicin IV daily for 4 days beginning today.  Augmentin 875 twice a day for 10 days.    PAST HISTORIES:  Problem List             ICD-10-CM Noted       Oncology Problems    Malignant neoplasm of left breast in female, estrogen receptor negative (CMD) C50.912, Z17.1 9/16/2024          Cancer Staging Summary for Silvia Hart       Malignant neoplasm of left breast in female, estrogen receptor negative (CMD)       Stage Date Classification Stage Status    9/16/24 Clinical Stage IIB (cT2, cN0, cM0, G2, ER-, CA-, HER2-) Signed by Lucio Vidales MD on 9/16/24                    MEDICATIONS / ALLERGIES:  Current Outpatient Medications   Medication Sig Dispense Refill    amoxicillin-clavulanate (AUGMENTIN) 875-125 MG per tablet Take 1 tablet by mouth in the morning and 1 tablet in the evening. Do  all this for 10 days. 20 tablet 0    dexAMETHasone (DECADRON) 4 MG tablet Take 2 tablets by mouth daily. Start the day after carboplatin for 3 days. 30 tablet 3    prochlorperazine (COMPAZINE) 10 MG tablet Take 1 tablet by mouth every 6 hours as needed for Nausea or Vomiting. 30 tablet 5    amLODIPine (NORVASC) 5 MG tablet Take 5 mg by mouth daily.      aspirin-acetaminophen-caffeine (EXCEDRIN MIGRAINE) 250-250-65 MG per tablet Take by mouth as needed.      losartan (COZAAR) 100 MG tablet Take 100 mg by mouth daily.      magnesium 250 MG tablet daily.      pantoprazole (PROTONIX) 40 MG tablet Take 40 mg by mouth.      topiramate (TOPAMAX) 50 MG tablet Take by mouth 2 times daily.       No current facility-administered medications for this visit.     Facility-Administered Medications Ordered in Other Visits   Medication Dose Route Frequency Provider Last Rate Last Admin    sodium chloride (NORMAL SALINE) 0.9 % bolus 1,000 mL  1,000 mL Intravenous Once PRN Lucio Vidales MD        sodium chloride (NORMAL SALINE) 0.9 % bolus 1,000 mL  1,000 mL Intravenous Once PRN Lucio Vidales MD        EPINEPHrine (ADRENALIN) injection 0.3 mg  0.3 mg Intramuscular Q5 Min PRN Lucio Vidales MD        diphenhydrAMINE (BENADRYL) injection 50 mg  50 mg Intravenous Once PRN Lucio Vidales MD        famotidine (PEPCID) injection 20 mg  20 mg Intravenous Once PRN Lucio Vidales MD        methylPREDNISolone (SOLU-Medrol) injection 125 mg  125 mg Intravenous Once PRN Lucio Vidales MD        albuterol inhaler 2 puff  2 puff Inhalation Q20 Min PRN Lucio Vidales MD        albuterol (VENTOLIN) nebulizer 5 mg  5 mg Nebulization Q20 Min PRN Lucio Vidales MD        morphine injection 2 mg  2 mg Intravenous PRN Lucio Vidales MD         ALLERGIES:   Allergen Reactions    Sucralfate SWELLING     Facial swelling        Review of systems:  Aimee Lopez MA  10/8/2024  9:38 AM  Sign when Signing  Visit  Silvia Hart is a 61 year old female here for  Chief Complaint   Patient presents with    Breast Cancer     Denies latex allergy or sensitivity.    Medication verified, no changes.  PCP and Pharmacy verified.    Social History     Tobacco Use   Smoking Status Every Day    Types: Cigarettes   Smokeless Tobacco Never     Advance Directives Filed: No    ECOG:   ECOG [10/08/24 0934]   ECOG Performance Status 0       Vitals:    Visit Vitals  /70   Pulse 77   Temp 98.2 °F (36.8 °C) (Temporal)   Resp 16   Wt 45.4 kg (100 lb 1.6 oz)   SpO2 97%   BMI 18.80 kg/m²       These vital signs are:  Within defined parameters (Per Reference \"Defined Limits Hospital Outpatient Department (HOD)\")    Height: No.  Ht Readings from Last 1 Encounters:   09/24/24 5' 1.18\" (1.554 m)     Weight:Yes, shoes on.  Wt Readings from Last 3 Encounters:   10/08/24 45.4 kg (100 lb 1.6 oz)   10/01/24 46.2 kg (101 lb 13.6 oz)   09/24/24 46.4 kg (102 lb 4.8 oz)       BMI: Body mass index is 18.8 kg/m².    REVIEW OF SYSTEMS  GENERAL:  Patient denies headache, fevers, chills, night sweats, excessive fatigue, change in appetite, weight loss, dizziness  ALLERGIC/IMMUNOLOGIC: Verified allergies: Yes  EYES:  Patient denies significant visual difficulties, double vision, blurred vision  ENT/MOUTH: Patient denies problems with hearing, sore throat, sinus drainage, mouth sores  ENDOCRINE:  Patient denies diabetes, thyroid disease, hormone replacement, hot flashes  HEMATOLOGIC/LYMPHATIC: Patient denies easy bruising, bleeding, tender lymph nodes, swollen lymph nodes  BREASTS: Patient denies abnormal masses of breast, nipple discharge, pain  RESPIRATORY:  Patient denies lung pain with breathing, cough, coughing up blood, shortness of breath  CARDIOVASCULAR:  Patient denies anginal chest pain, palpitations, shortness of breath when lying flat, peripheral edema  GASTROINTESTINAL: Patient denies abdominal pain , nausea, vomiting, diarrhea, GI  bleeding, constipation, change in bowel habits, heartburn, sensation of feeling full, difficulty swallowing  : Patient denies abnormal genital masses, blood in the urine, frequency, urgency, burning with urination, hesitancy, incontinence, vaginal bleeding, discharge  MUSCULOSKELETAL:  Patient denies joint pain, bone pain, joint swelling, redness, decreased range of motion  SKIN:  Patient denies chronic rashes, inflammation, ulcerations, skin changes, itching, but complains of: skin changes port is painful and red 8/10, feels the skin pulling when moving R arm, clothing rubbing against port is irritating  NEUROLOGIC:  Patient denies loss of balance, areas of focal weakness, abnormal gait, sensory problems, numbness, tingling  PSYCHIATRIC: Patient denies insomnia, depression, anxiety    This patient reported abnormal symptoms that needed immediate verbal communication: No         PHYSICAL EXAM:  Vital Signs:   Oncology Encounter Vitals [10/08/24 0933]   ONC OP Encounter Vitals Group      /70      Heart Rate 77      Resp 16      Temp 98.2 °F (36.8 °C)      Temp src Temporal      SpO2 97 %      Weight 100 lb 1.6 oz (45.4 kg)      Height       Pain Score 8      Pain Location       Pain Education?       BSA (Calculated - m2) - Juan & Juan       BSA (Calculated - sq m)       BMI (Calculated)      ECOG Performance Status:   ECOG [10/08/24 0934]   ECOG Performance Status 0     General: The patient is alert, well-developed, well-nourished, no distress.  Skin: Warm, normal color, normal texture, normal turgor and without rash.  Head: Normocephalic, atraumatic.  Neck: Supple   Psychiatric: Cooperative. Appropriate mood and affect. Normal judgment.     Erythematous and tender mediport site noted on the integumentary system, no discharge or open areas.    DATA REVIEWED:  Laboratory Data:  Recent Labs   Lab 10/08/24  0856   WBC 5.4   RBC 3.68*   HGB 11.5*   HCT 36.0   MCV 97.8      Absolute Neutrophils 3.7    Absolute Lymphocytes 1.0   Absolute Monocytes 0.5   Absolute Eosinophils  0.2   Absolute Basophils 0.1     Recent Labs   Lab 10/08/24  0856 10/01/24  1100 09/24/24  0739   Glucose 114*   < > 174*   Sodium 138   < > 144   Potassium 3.5   < > 3.1*   Chloride 104   < > 107   Carbon Dioxide 24   < > 25   BUN 13   < > 14   Creatinine 0.73   < > 0.73   Calcium 8.8   < > 8.4   Magnesium  --   --  1.9   Protein, Total 7.1   < > 6.4   Albumin 4.0   < > 3.7   GOT/AST 12   < > 9   Alkaline Phosphatase 78   < > 63   GPT 16   < > 8    < > = values in this interval not displayed.     Recent Labs   Lab 10/08/24  0856   Anion Gap 14   Globulin 3.1   Bilirubin, Total 0.4       Imaging Studies:NONE        The patient, sister(s) indicated understanding of the diagnosis and agreed with the plan of care. The patient is encouraged to call between now and next visit with any problems, questions or concerns that arise.         No.